# Patient Record
Sex: MALE | Race: BLACK OR AFRICAN AMERICAN | NOT HISPANIC OR LATINO | URBAN - METROPOLITAN AREA
[De-identification: names, ages, dates, MRNs, and addresses within clinical notes are randomized per-mention and may not be internally consistent; named-entity substitution may affect disease eponyms.]

---

## 2018-06-01 ENCOUNTER — OUTPATIENT (OUTPATIENT)
Dept: OUTPATIENT SERVICES | Facility: HOSPITAL | Age: 38
LOS: 1 days | End: 2018-06-01

## 2018-06-17 ENCOUNTER — EMERGENCY (EMERGENCY)
Facility: HOSPITAL | Age: 38
LOS: 1 days | Discharge: HOME | End: 2018-06-17
Attending: PERSONAL EMERGENCY RESPONSE ATTENDANT
Payer: MEDICAID

## 2018-06-17 VITALS
DIASTOLIC BLOOD PRESSURE: 79 MMHG | TEMPERATURE: 98 F | OXYGEN SATURATION: 100 % | HEART RATE: 78 BPM | RESPIRATION RATE: 16 BRPM | SYSTOLIC BLOOD PRESSURE: 136 MMHG

## 2018-06-17 VITALS
HEART RATE: 71 BPM | DIASTOLIC BLOOD PRESSURE: 84 MMHG | SYSTOLIC BLOOD PRESSURE: 141 MMHG | TEMPERATURE: 98 F | OXYGEN SATURATION: 98 % | RESPIRATION RATE: 17 BRPM

## 2018-06-17 LAB
ANION GAP SERPL CALC-SCNC: 13 MMOL/L — SIGNIFICANT CHANGE UP (ref 5–17)
BASE EXCESS BLDV CALC-SCNC: 2 MMOL/L — SIGNIFICANT CHANGE UP (ref -2–2)
BUN SERPL-MCNC: 13 MG/DL — SIGNIFICANT CHANGE UP (ref 7–23)
CA-I SERPL-SCNC: 1.18 MMOL/L — SIGNIFICANT CHANGE UP (ref 1.12–1.3)
CALCIUM SERPL-MCNC: 9.2 MG/DL — SIGNIFICANT CHANGE UP (ref 8.4–10.5)
CHLORIDE BLDV-SCNC: 108 MMOL/L — SIGNIFICANT CHANGE UP (ref 96–108)
CHLORIDE SERPL-SCNC: 103 MMOL/L — SIGNIFICANT CHANGE UP (ref 96–108)
CO2 BLDV-SCNC: 28 MMOL/L — SIGNIFICANT CHANGE UP (ref 22–30)
CO2 SERPL-SCNC: 23 MMOL/L — SIGNIFICANT CHANGE UP (ref 22–31)
CREAT SERPL-MCNC: 0.96 MG/DL — SIGNIFICANT CHANGE UP (ref 0.5–1.3)
GAS PNL BLDV: 137 MMOL/L — SIGNIFICANT CHANGE UP (ref 136–145)
GAS PNL BLDV: SIGNIFICANT CHANGE UP
GAS PNL BLDV: SIGNIFICANT CHANGE UP
GLUCOSE BLDV-MCNC: 86 MG/DL — SIGNIFICANT CHANGE UP (ref 70–99)
GLUCOSE SERPL-MCNC: 92 MG/DL — SIGNIFICANT CHANGE UP (ref 70–99)
HCO3 BLDV-SCNC: 27 MMOL/L — SIGNIFICANT CHANGE UP (ref 21–29)
HCT VFR BLD CALC: 44.6 % — SIGNIFICANT CHANGE UP (ref 39–50)
HCT VFR BLDA CALC: 47 % — SIGNIFICANT CHANGE UP (ref 39–50)
HGB BLD CALC-MCNC: 15.3 G/DL — SIGNIFICANT CHANGE UP (ref 13–17)
HGB BLD-MCNC: 15.6 G/DL — SIGNIFICANT CHANGE UP (ref 13–17)
HIV 1+2 AB+HIV1 P24 AG SERPL QL IA: SIGNIFICANT CHANGE UP
LACTATE BLDV-MCNC: 1.3 MMOL/L — SIGNIFICANT CHANGE UP (ref 0.7–2)
MCHC RBC-ENTMCNC: 32.2 PG — SIGNIFICANT CHANGE UP (ref 27–34)
MCHC RBC-ENTMCNC: 34.9 GM/DL — SIGNIFICANT CHANGE UP (ref 32–36)
MCV RBC AUTO: 92.3 FL — SIGNIFICANT CHANGE UP (ref 80–100)
OTHER CELLS CSF MANUAL: 16 ML/DL — LOW (ref 18–22)
PCO2 BLDV: 44 MMHG — SIGNIFICANT CHANGE UP (ref 35–50)
PH BLDV: 7.4 — SIGNIFICANT CHANGE UP (ref 7.35–7.45)
PLATELET # BLD AUTO: 284 K/UL — SIGNIFICANT CHANGE UP (ref 150–400)
PO2 BLDV: 42 MMHG — SIGNIFICANT CHANGE UP (ref 25–45)
POTASSIUM BLDV-SCNC: 3.8 MMOL/L — SIGNIFICANT CHANGE UP (ref 3.5–5.3)
POTASSIUM SERPL-MCNC: 4 MMOL/L — SIGNIFICANT CHANGE UP (ref 3.5–5.3)
POTASSIUM SERPL-SCNC: 4 MMOL/L — SIGNIFICANT CHANGE UP (ref 3.5–5.3)
RBC # BLD: 4.83 M/UL — SIGNIFICANT CHANGE UP (ref 4.2–5.8)
RBC # FLD: 11.5 % — SIGNIFICANT CHANGE UP (ref 10.3–14.5)
SAO2 % BLDV: 77 % — SIGNIFICANT CHANGE UP (ref 67–88)
SODIUM SERPL-SCNC: 139 MMOL/L — SIGNIFICANT CHANGE UP (ref 135–145)
WBC # BLD: 6.1 K/UL — SIGNIFICANT CHANGE UP (ref 3.8–10.5)
WBC # FLD AUTO: 6.1 K/UL — SIGNIFICANT CHANGE UP (ref 3.8–10.5)

## 2018-06-17 PROCEDURE — 85014 HEMATOCRIT: CPT

## 2018-06-17 PROCEDURE — 84295 ASSAY OF SERUM SODIUM: CPT

## 2018-06-17 PROCEDURE — 82947 ASSAY GLUCOSE BLOOD QUANT: CPT

## 2018-06-17 PROCEDURE — 83605 ASSAY OF LACTIC ACID: CPT

## 2018-06-17 PROCEDURE — 82330 ASSAY OF CALCIUM: CPT

## 2018-06-17 PROCEDURE — 82803 BLOOD GASES ANY COMBINATION: CPT

## 2018-06-17 PROCEDURE — 80048 BASIC METABOLIC PNL TOTAL CA: CPT

## 2018-06-17 PROCEDURE — 85027 COMPLETE CBC AUTOMATED: CPT

## 2018-06-17 PROCEDURE — 87040 BLOOD CULTURE FOR BACTERIA: CPT

## 2018-06-17 PROCEDURE — 84132 ASSAY OF SERUM POTASSIUM: CPT

## 2018-06-17 PROCEDURE — 99284 EMERGENCY DEPT VISIT MOD MDM: CPT

## 2018-06-17 PROCEDURE — 99283 EMERGENCY DEPT VISIT LOW MDM: CPT

## 2018-06-17 PROCEDURE — 86780 TREPONEMA PALLIDUM: CPT

## 2018-06-17 PROCEDURE — 82435 ASSAY OF BLOOD CHLORIDE: CPT

## 2018-06-17 PROCEDURE — 87389 HIV-1 AG W/HIV-1&-2 AB AG IA: CPT

## 2018-06-17 RX ORDER — CEPHALEXIN 500 MG
1 CAPSULE ORAL
Qty: 20 | Refills: 0 | OUTPATIENT
Start: 2018-06-17 | End: 2018-06-21

## 2018-06-17 NOTE — ED PROVIDER NOTE - NS ED ROS FT
ROS:   Gen: + chills. No fever, nausea, vomiting  Resp: No cough, SOB  CV: No chest pain, palpitations  GI: No abdominal pain, diarrhea, constipation   : No dysuria, frequency, hematuria, penile lesions or drainage.   Skin: + b/l hand desquamation, b/l arm rash, b/l hand wounds. No pruiritis  MSK: + arthralgia with flexion. No weakness.

## 2018-06-17 NOTE — ED PROVIDER NOTE - CARE PLAN
Principal Discharge DX:	Rash and nonspecific skin eruption Principal Discharge DX:	Rash and nonspecific skin eruption  Goal:	Concern for eczema

## 2018-06-17 NOTE — ED ADULT NURSE NOTE - OBJECTIVE STATEMENT
38 year old male A&OX4 pmhx of Eczema, presents with increasingly worsening rash to the palms of the hand that have begin to travel up the forearms. Patient states that over the last several days palms have developed skin breakdown as well as subjective pruritis. Patient states that he has been seen by a dermatologist and was prescribed a topical and injectable steroid to which he does not recall the name. Skin is hardened to touch and there are multiple breaks in skin. Patient also notes that he has limited strength in both hands. Patient unable to open and close completely.  Patient denies fevers, chills, nausea, vomiting, dizziness, weakness, chest pain, shortness of breath.

## 2018-06-17 NOTE — ED PROVIDER NOTE - PHYSICAL EXAMINATION
General: No apparent distress  Neck: Supple, NO JVD  Cardiac: RRR, Normal S1/S2, No M/R/G  Pulmonary: CTABL, No Rhonchi/Rales/Wheezing  Abdomen: Soft, NTND, +BS  Extremities: 2+ b/l LE peripheral pulses. No edema  - Hands: b/l desquamation of hands, multiple open wounds without purulence  - Arms: maculopapular rash to mid way up proximal arm without edema  - Feet: b/l small scaly plaques on medial aspect of feet.   Neuro: A&O   x 3, No focal deficits  Skin: No rashes

## 2018-06-17 NOTE — ED PROVIDER NOTE - OBJECTIVE STATEMENT
38M w/PMH of chronic eczema p/w worsening of pealing and open wounds on b/l hands. Primarily on hands and feet. Usually has topical steroids. Was "really bad last year" and received steroids injections. Has not been using anything recently. 2 wks ago pt has open wounds on hands. No pus but some drainage. + chills. Past 2 days tracking up arm w/redness. Has pain in hands w/ flexion of fingers. Has not seen dermatologist. New female sexual partner one month ago did not always use condoms. No sick children. No new soap/chemical exposure, travel, outdoor hobbies. 38M w/PMH of chronic eczema p/w worsening of pealing and open wounds on b/l hands. Primarily on hands and feet. Usually has topical steroids. Was "really bad last year" and received steroids injections. Has not been using anything recently. 2 wks ago pt has open wounds on hands. No pus but some drainage. + chills. Past 2 days tracking up arm w/redness. Has pain in hands w/ flexion of fingers. Has not seen dermatologist. New female sexual partner one month ago did not always use condoms. No sick children. No new soap/chemical exposure, travel, outdoor hobbies, medications.

## 2018-06-17 NOTE — ED PROVIDER NOTE - PROGRESS NOTE DETAILS
WBC normal. HIV, RPR, Bcx pending. Derm pending. Attending MD Ch.  Discussed case with dermatology who are recommending triamcinolone 0.1% ointment to affected areas 2x/day.  Discussed conern for dishydrotic vs. hyperkeratotic vesicular palmoplantar eczema.Derm also recommending triamcinolone on affected area and then vasiline over hand with glove thereover.    Derm also recommending that I send email to [email or web address] with name, , contact info to expedite follow-up which has been done.  Derm took pt's name and will follow-up tomorrow re: arranging appt.  Pt counseled regarding above.  Return to ED for fevers/worsened pain, development of weeping, arm pain, CP/SOB/light-headedness.

## 2018-06-18 LAB — T PALLIDUM AB TITR SER: NEGATIVE — SIGNIFICANT CHANGE UP

## 2018-06-19 DIAGNOSIS — R69 ILLNESS, UNSPECIFIED: ICD-10-CM

## 2018-06-22 LAB
CULTURE RESULTS: SIGNIFICANT CHANGE UP
CULTURE RESULTS: SIGNIFICANT CHANGE UP
SPECIMEN SOURCE: SIGNIFICANT CHANGE UP
SPECIMEN SOURCE: SIGNIFICANT CHANGE UP

## 2018-07-01 ENCOUNTER — OUTPATIENT (OUTPATIENT)
Dept: OUTPATIENT SERVICES | Facility: HOSPITAL | Age: 38
LOS: 1 days | End: 2018-07-01

## 2018-07-19 DIAGNOSIS — Z71.89 OTHER SPECIFIED COUNSELING: ICD-10-CM

## 2021-05-03 ENCOUNTER — EMERGENCY (EMERGENCY)
Facility: HOSPITAL | Age: 41
LOS: 1 days | Discharge: HOME | End: 2021-05-03
Payer: MEDICAID

## 2021-05-03 VITALS
SYSTOLIC BLOOD PRESSURE: 109 MMHG | HEIGHT: 69 IN | OXYGEN SATURATION: 99 % | HEART RATE: 82 BPM | RESPIRATION RATE: 18 BRPM | DIASTOLIC BLOOD PRESSURE: 65 MMHG | WEIGHT: 169.98 LBS | TEMPERATURE: 98 F

## 2021-05-03 LAB — SARS-COV-2 RNA SPEC QL NAA+PROBE: SIGNIFICANT CHANGE UP

## 2021-05-03 PROCEDURE — 99282 EMERGENCY DEPT VISIT SF MDM: CPT

## 2021-05-03 NOTE — ED PROVIDER NOTE - NS ED ROS FT
Denies fever, chills, fatigue, Denies HA, Dizziness.   ENMT: Denies URI symptoms, difficulty swallowing, sore throat, loss of taste or smell.   CARDIO: Denies CP,   RESP: Denies Cough, SOB, Diff breathing,   GI: Denies N/V, ABD pain, change in bowel movement..   MS: Denies joint pain, back pain, weakness,

## 2021-05-03 NOTE — ED PROVIDER NOTE - PATIENT PORTAL LINK FT
You can access the FollowMyHealth Patient Portal offered by [facility] by registering at the following website: [email or web address]. By joining [employer]’s FollowMyHealth portal, you will also be able to view your health information using other applications (apps) compatible with our system. Yes

## 2021-05-03 NOTE — ED PROVIDER NOTE - OBJECTIVE STATEMENT
Pt presenting to the ER for COVID-19 testing. for work. Denies fevers chills, loss of taste or smell, URI symptoms, chest pain or shortness of breath, nausea vomiting diarrhea abdominal pain, weakness or fatigue. Eating and drinking normal diet. Normal output. Pt requesting testing at this time.

## 2021-05-06 PROCEDURE — 99283 EMERGENCY DEPT VISIT LOW MDM: CPT

## 2021-05-06 PROCEDURE — U0003: CPT

## 2021-05-06 PROCEDURE — U0005: CPT

## 2022-05-28 ENCOUNTER — EMERGENCY (EMERGENCY)
Facility: HOSPITAL | Age: 42
LOS: 1 days | Discharge: HOME | End: 2022-05-28
Admitting: EMERGENCY MEDICINE
Payer: MEDICAID

## 2022-05-28 VITALS
RESPIRATION RATE: 18 BRPM | DIASTOLIC BLOOD PRESSURE: 87 MMHG | OXYGEN SATURATION: 99 % | SYSTOLIC BLOOD PRESSURE: 131 MMHG | HEART RATE: 81 BPM | TEMPERATURE: 98 F

## 2022-05-28 VITALS
RESPIRATION RATE: 18 BRPM | OXYGEN SATURATION: 98 % | SYSTOLIC BLOOD PRESSURE: 133 MMHG | HEIGHT: 69 IN | DIASTOLIC BLOOD PRESSURE: 85 MMHG | TEMPERATURE: 98 F | WEIGHT: 184.97 LBS | HEART RATE: 86 BPM

## 2022-05-28 DIAGNOSIS — F17.200 NICOTINE DEPENDENCE, UNSPECIFIED, UNCOMPLICATED: ICD-10-CM

## 2022-05-28 DIAGNOSIS — S43.005A UNSPECIFIED DISLOCATION OF LEFT SHOULDER JOINT, INITIAL ENCOUNTER: ICD-10-CM
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
## 2022-05-28 DIAGNOSIS — M25.512 PAIN IN LEFT SHOULDER: ICD-10-CM
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
## 2022-05-28 PROCEDURE — 73030 X-RAY EXAM OF SHOULDER: CPT | Mod: 26,LT

## 2022-05-28 PROCEDURE — 99284 EMERGENCY DEPT VISIT MOD MDM: CPT | Mod: 57

## 2022-05-28 PROCEDURE — 23650 CLTX SHO DSLC W/MNPJ WO ANES: CPT | Mod: 54

## 2022-05-28 PROCEDURE — 73030 X-RAY EXAM OF SHOULDER: CPT | Mod: 26

## 2022-05-28 RX ORDER — DIAZEPAM 5 MG
5 TABLET ORAL ONCE
Refills: 0 | Status: DISCONTINUED | OUTPATIENT
Start: 2022-05-28 | End: 2022-05-28

## 2022-05-28 RX ADMIN — Medication 5 MILLIGRAM(S): at 19:30

## 2022-05-28 NOTE — ED PROVIDER NOTE - OBJECTIVE STATEMENT
PMHx R shoulder dislocation presents with L shoulder pain. states that he lost his balance while stepping into an inflatable bouncy house hitting his L shoulder. denies head trauma, LOC, dizziness, focal weakness. admits to pain over shoulder and paresthesias over L hand. admits to smoking marajuana today. patient was given fentanyl 75mcg by EMS prior to arrival without improvement of pain

## 2022-05-28 NOTE — ED PROVIDER NOTE - CARE PROVIDER_API CALL
Amado Cooper (MD)  [facility]  Orthopedics  [address], [address] [place] [address]  Phone: [phone]  Fax: [phone]  Follow Up Time:

## 2022-05-28 NOTE — ED PROVIDER NOTE - CONSTITUTIONAL NEGATIVE STATEMENT, MLM
Will continue to f/u for GOC/ACP.         Amauri Martinez MD   Geriatrics and Palliative Care ([facility]) Consult Service    of Geriatric and Palliative Medicine  [facility]      Please page the following number for clinical matters between the hours of 9 am and 5 pm from Monday through Friday : [phone]    After 5pm and on weekends, please see the contact information below:    In the event of newly developing, evolving, or worsening symptoms, please contact the Palliative Medicine team via pager (if the patient is at [facility] #[phone] or if the patient is at [facility] #[phone]) The Geriatric and Palliative Medicine service has coverage 24 hours a day/ 7 days a week to provide medical recommendations regarding symptom management needs via telephone
no fever and no chills.

## 2022-05-28 NOTE — ED PROVIDER NOTE - CLINICAL SUMMARY MEDICAL DECISION MAKING FREE TEXT BOX
s/p fall in bouncy house landing on L shoulder with anterior shoulder dislocation. closed reduction performed without complication. patient given sling. advised follow up with ortho

## 2022-05-28 NOTE — ED PROVIDER NOTE - PATIENT PORTAL LINK FT
You can access the FollowMyHealth Patient Portal offered by [facility] by registering at the following website: [email or web address]. By joining [employer]’s FollowMyHealth portal, you will also be able to view your health information using other applications (apps) compatible with our system.

## 2022-05-28 NOTE — ED ADULT TRIAGE NOTE - CHIEF COMPLAINT QUOTE
Pt presents c/o 10/10 left shoulder pain 2ndary to injury sustained while "bouncing in a bounce house."  Pt endorses hx of right shoulder dislocation which required surgical fixation.  Pt rcvd 75 mcg of fentanyl via 20G to RAC PTA.  Pt splinted by EMS.

## 2022-05-28 NOTE — ED PROVIDER NOTE - MUSCULOSKELETAL MINIMAL EXAM
due to pain, + deformity, skin intact, radial pulses equal BL, FROM all fingers, no drop wrist/RANGE OF MOTION LIMITED

## 2022-05-28 NOTE — ED ADULT NURSE NOTE - NSIMPLEMENTINTERV_GEN_ALL_ED
Implemented All Universal Safety Interventions:  [place] to call system. Call bell, personal items and telephone within reach. Instruct patient to call for assistance. Room bathroom lighting operational. Non-slip footwear when patient is off stretcher. Physically safe environment: no spills, clutter or unnecessary equipment. Stretcher in lowest position, wheels locked, appropriate side rails in place.

## 2023-11-17 NOTE — ED PROVIDER NOTE - ATTENDING CONTRIBUTION TO CARE
Home
Attending MD Ch.  Agree with above.  Pt is a 38 yr old male with hx of eczema on hands/feet and usses topical steroids.  Last year had injectable sterodis by derm because it was 'really bad'.  Now for sev'l wks pain and desquamation has worsened.  Had not been using his topical steroids recently.  No new exposures.  Has a small child at home 4 yo.  No new meds.  Only sig hx is in past month pt has a new sexual partner with occasional unprotected sex.  This is the 'worst his hands have ever been'.  Pain with flexion of hands.  No joint swelling.  Rash is c/w peeling skin, open wounds, no purulence, serous drainage.  Rash on arm c/w erythema with maculopapular rash.  No rash on palms.  Skin peeling on palms. Concern for superinfection of eczema. No penile drainage/testicular lesions.  Amenable to screening HIV/RPR.  NO involvement of soles of feet. 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  Planned screening labs including RPR/HIV however chronicity makes these less likely.  Will discuss management with dermatology for admission vs. short order outpt follow-up.

## 2025-03-24 ENCOUNTER — EMERGENCY (EMERGENCY)
Facility: HOSPITAL | Age: 45
LOS: 1 days | Discharge: HOME | End: 2025-03-24
Admitting: STUDENT IN AN ORGANIZED HEALTH CARE EDUCATION/TRAINING PROGRAM
Payer: MEDICAID

## 2025-03-24 VITALS
DIASTOLIC BLOOD PRESSURE: 78 MMHG | WEIGHT: 184.97 LBS | SYSTOLIC BLOOD PRESSURE: 132 MMHG | OXYGEN SATURATION: 98 % | RESPIRATION RATE: 18 BRPM | TEMPERATURE: 98 F | HEART RATE: 83 BPM

## 2025-03-24 PROCEDURE — 99284 EMERGENCY DEPT VISIT MOD MDM: CPT

## 2025-03-24 RX ORDER — DEXAMETHASONE 0.5 MG/1
8 TABLET ORAL ONCE
Refills: 0 | Status: COMPLETED | OUTPATIENT
Start: 2025-03-24 | End: 2025-03-24

## 2025-03-24 RX ORDER — BETAMETHASONE DIPROPIONATE 0.5 MG/G
1 OINTMENT, AUGMENTED TOPICAL
Refills: 0 | Status: ACTIVE | OUTPATIENT
Start: 2025-03-24 | End: 2026-02-20

## 2025-03-24 RX ADMIN — DEXAMETHASONE 8 MILLIGRAM(S): 0.5 TABLET ORAL at 21:33

## 2025-03-24 RX ADMIN — BETAMETHASONE DIPROPIONATE 1 APPLICATION(S): 0.5 OINTMENT, AUGMENTED TOPICAL at 21:33

## 2025-03-24 NOTE — ED PROVIDER NOTE - NSFOLLOWUPINSTRUCTIONS_ED_ALL_ED_FT
You were seen in the emergency room.  You were given a steroid injection.  You were also given steroid cream for your eczema.  It is important you follow-up with dermatology.  You will receive a call in a few days to help make an appointment.  Please return to ER if any new, worsening or persistent symptoms.    Itchy, Irritated Skin (Eczema): What to Know  Eczema is a group of skin conditions that cause rough and inflamed skin. There are different types of eczema. They each have different triggers, symptoms, and treatments.    Eczema is not contagious, so it doesn't spread from person to person. It can appear on different parts of your body at different times. It doesn't look the same on everyone.    What are the causes?  The exact cause of eczema isn't known. Things that can make it worse includes:  Irritants.  Allergens.  What are the signs or symptoms?  A person's hands, showing areas of redness and blisters caused by eczema.  Symptoms depend on the type of eczema you have. They can range from mild to very bad. Symptoms often include:  Itchiness.  Dry skin.  Rash or skin bumps.  Swelling.  Crusty, flaky, or scaly patches.  Thick patches of skin.  Oozing skin or blisters.  How is this diagnosed?  This condition may be diagnosed based on:  Symptoms.  Physical exam.  Medical history.  Skin patch tests that use allergen patches on your back to check for allergic reactions.  You may need to see a skin specialist called a dermatologist. This specialist can help diagnose and treat this condition.    How is this treated?  There's no cure for eczema, but you can manage your symptoms. Treatment depends on the type of eczema you have. Options may include:  Medicines to lessen itching (antihistamines).  Medicine to put on your skin to lessen swelling and irritation (corticosteroid creams or ointments).  Light therapy, also called phototherapy. The affected skin is put under ultraviolet (UV) light.  Medicines may be prescribed or purchased at the store. This will depend on the strength that's needed.    Follow these instructions at home:  Skin Care    Use skin creams or lotions as told.  Do not scratch your skin. This can make your rash worse.  Keep fingernails short to avoid scratching open the skin.  General instructions    Take or apply your medicines as told.  Avoid triggers and allergens.  Treat symptoms fast if you have a flare-up.  Keep all follow-up visits to make sure your treatment plan is working.  Where to find more information  American Academy of Dermatology: aad.org  National Eczema Association: nationaleczema.org  The Society for Pediatric Dermatology: pedsderm.net  Contact a health care provider if:  You have very bad itching even with treatment.  You often scratch your skin until it bleeds.  Your rash looks different than normal.  You have a fever.  You have symptoms that don't go away with treatment.  You have more redness, pain, or swelling over the affected skin.  You have warmth or pus coming from the affected skin.

## 2025-03-24 NOTE — ED ADULT NURSE NOTE - OBJECTIVE STATEMENT
Patient received in wellness, exam room 1. Patient A&Ox3 and ambulatory at baseline. Patient presents to the ED c/o eczema flare up. phx eczema. Patient endorses 1 week of worsenign eczema to b/l hands; b/l hands noted to be scaly and rough. Patient denies headache, dizziness, lightheadedness, nausea/vomiting, fever/chills, and pain. Patient offers no complaints at this time. Respirations even and unlabored, no signs/symptoms of acute distress; patient denies dyspnea, shortness of breath, and chest pain. Patient is stable at this time. Steady gait observed.

## 2025-03-24 NOTE — ED PROVIDER NOTE - PHYSICAL EXAMINATION
+ eczema to b/l hands with + cracked skin, + slight oozing of clear liquid, no pus or fluctuance, no lymphangitic streaking, compartments soft, sensation intact

## 2025-03-24 NOTE — ED PROVIDER NOTE - CLINICAL SUMMARY MEDICAL DECISION MAKING FREE TEXT BOX
Patient is a 44-year-old male past medical history eczema who presented to ED with eczema flare to both hands.  reports he has not been compliant with treatment or follow up, requesting steroid cream and derm referral.

## 2025-03-24 NOTE — ED ADULT NURSE NOTE - NSFALLUNIVINTERV_ED_ALL_ED
Bed/Stretcher in lowest position, wheels locked, appropriate side rails in place/Call bell, personal items and telephone in reach/Instruct patient to call for assistance before getting out of bed/chair/stretcher/Non-slip footwear applied when patient is off stretcher/[place] to call system/Physically safe environment - no spills, clutter or unnecessary equipment/Purposeful proactive rounding/Room/bathroom lighting operational, light cord in reach

## 2025-03-24 NOTE — ED ADULT NURSE NOTE - NSICDXPASTSURGICALHX_GEN_ALL_CORE_FT
[Never] : never [TextBox_4] : He is a 76-year-old man.  He has been coughing for the past several months.  The cough is nonproductive.  He denied any wheezing or shortness of breath.  No constitutional symptoms.  No prior history of pulmonary disease.  He never smoked.\par \par His wife  from pulmonary fibrosis and he is concerned about this condition.\par \par He is feeling well. No complaint of cough, wheezing or shortness of breath. No fever, chills or sweats.  [Difficulty Initiating Sleep] : does not have difficulty initiating sleep [Difficulty Maintaining Sleep] : does not have difficulty maintaining sleep PAST SURGICAL HISTORY:  No significant past surgical history

## 2025-03-24 NOTE — ED PROVIDER NOTE - PATIENT PORTAL LINK FT
You can access the FollowMyHealth Patient Portal offered by [facility] by registering at the following website: [email or web address]. By joining [employer]’s FollowMyHealth portal, you will also be able to view your health information using other applications (apps) compatible with our system.

## 2025-03-24 NOTE — ED PROVIDER NOTE - OBJECTIVE STATEMENT
Patient is a 44-year-old male past medical history eczema who presented to ED with eczema flare to both hands.  Patient reports for the past week he has full his eczema flaring up.  Patient reports he has not followed up for his eczema in "a while" and therefore does not have any new creams.  Patient reports normally when he has a flareup this intense he requires a "steroid injection".  Patient also requesting a referral for dermatology so he can reestablish care.  Patient otherwise denies fevers or chills.